# Patient Record
Sex: FEMALE | Race: WHITE | NOT HISPANIC OR LATINO | URBAN - METROPOLITAN AREA
[De-identification: names, ages, dates, MRNs, and addresses within clinical notes are randomized per-mention and may not be internally consistent; named-entity substitution may affect disease eponyms.]

---

## 2023-05-15 PROBLEM — L70.9 ACNE: Status: RESOLVED | Noted: 2023-05-15 | Resolved: 2023-05-15

## 2023-05-15 PROBLEM — S29.012A STRAIN OF THORACIC BACK REGION: Status: RESOLVED | Noted: 2020-01-13 | Resolved: 2023-05-15

## 2023-05-15 PROBLEM — R07.89 MUSCULOSKELETAL CHEST PAIN: Status: RESOLVED | Noted: 2023-05-15 | Resolved: 2023-05-15

## 2023-05-15 RX ORDER — DROSPIRENONE AND ETHINYL ESTRADIOL 0.03MG-3MG
KIT ORAL
COMMUNITY
Start: 2019-09-03

## 2023-07-21 ENCOUNTER — TELEPHONE (OUTPATIENT)
Dept: PEDIATRICS | Facility: CLINIC | Age: 18
End: 2023-07-21
Payer: COMMERCIAL

## 2023-07-25 ENCOUNTER — TELEPHONE (OUTPATIENT)
Dept: PEDIATRICS | Facility: CLINIC | Age: 18
End: 2023-07-25
Payer: COMMERCIAL

## 2023-10-10 ENCOUNTER — OFFICE VISIT (OUTPATIENT)
Dept: PEDIATRICS | Facility: CLINIC | Age: 18
End: 2023-10-10
Payer: COMMERCIAL

## 2023-10-10 VITALS — WEIGHT: 152 LBS | BODY MASS INDEX: 23.11 KG/M2 | TEMPERATURE: 97.3 F

## 2023-10-10 DIAGNOSIS — J01.00 ACUTE NON-RECURRENT MAXILLARY SINUSITIS: Primary | ICD-10-CM

## 2023-10-10 PROCEDURE — 99213 OFFICE O/P EST LOW 20 MIN: CPT | Performed by: NURSE PRACTITIONER

## 2023-10-10 RX ORDER — ALBUTEROL SULFATE 90 UG/1
AEROSOL, METERED RESPIRATORY (INHALATION)
COMMUNITY
Start: 2023-10-09

## 2023-10-10 RX ORDER — METHYLPREDNISOLONE 4 MG/1
TABLET ORAL
COMMUNITY
Start: 2023-10-09

## 2023-10-10 RX ORDER — AMOXICILLIN AND CLAVULANATE POTASSIUM 875; 125 MG/1; MG/1
1 TABLET, FILM COATED ORAL 2 TIMES DAILY
Qty: 20 TABLET | Refills: 0 | Status: SHIPPED | OUTPATIENT
Start: 2023-10-10 | End: 2023-10-20

## 2023-10-10 RX ORDER — AMOXICILLIN 875 MG/1
875 TABLET, FILM COATED ORAL 2 TIMES DAILY
Qty: 20 TABLET | Refills: 0 | Status: SHIPPED | OUTPATIENT
Start: 2023-10-10 | End: 2023-10-10 | Stop reason: ENTERED-IN-ERROR

## 2023-10-10 NOTE — PROGRESS NOTES
Subjective     Abril Sepulveda is a 17 y.o. female who presents for URI.    Today she is accompanied by self.    HPI  Presents with 2 week history of nasal congestion and cough. Was seen via telehealth last week and provided with albuterol and oral steroid pack for viral URI with cough. Started oral steroid yesterday but has not used albuterol. Has ear fullness and sore throat at times. No fever. No vomiting or diarrhea. No rash.     Review of Systems    Constitutional: negative for fever.   ENT: Negative for ear pain or drainage, positive for nasal congestion.  Cardiovascular: negative for chest pain  Respiratory: Negative for  shortness of breath, increased work of breathing, wheezing. Positive for cough  Gastrointestinal: Negative for abdominal pain, vomiting, diarrhea, constipation  Integumentary: Negative for rash or lesions    Objective   Temp 36.3 °C (97.3 °F)   Wt 68.9 kg (152 lb)   BMI 23.11 kg/m²   BSA: 1.82 meters squared  Growth percentiles: No height on file for this encounter. 82 %ile (Z= 0.90) based on CDC (Girls, 2-20 Years) weight-for-age data using vitals from 10/10/2023.     Physical Exam    Gen: Well-appearing, well-hydrated, in NAD.  Skin: Warm with no rash or lesions.  EENT:  Nasal congestion with postnasal drip, cobblestoned, with copious purulent drainage. Turbinates beefy and boggy. Tympanic membranes are full with dull landmarks bilaterally.  No conjunctival injection or drainage. Mouth and posterior pharynx without oral lesion or exudates. Moist mucous membranes.  Neck: Supple without lymphadenopathy or masses.  Cardiovascular: Heart with regular rate and rhythm. No significant murmur. Bilateral distal pulses 2+, capillary refill 2 seconds.  Lung: Clear to auscultation bilaterally. No increased work of breathing. Good air exchange. No wheezes, rales, rhonchi.  Abdomen: Soft, nontender, without hepatosplenomegaly. No palpable mass.   Assessment/Plan   Presents with sinusitis. In the  middle of medrol dose nilton and will finish it but will start augmentin course.   Problem List Items Addressed This Visit    None

## 2023-11-21 ENCOUNTER — APPOINTMENT (OUTPATIENT)
Dept: PEDIATRICS | Facility: CLINIC | Age: 18
End: 2023-11-21
Payer: COMMERCIAL